# Patient Record
(demographics unavailable — no encounter records)

---

## 2025-03-31 NOTE — ASSESSMENT
[FreeTextEntry1] : # HM f/u AV labs Never had cervical ca screen before, referred to GYN  # Hair thinning, heavy menses f/u iron studies and B12/folate levels  # Moderately severe depression w/ suicidal ideation, moderate anxiety Strongly encouraged patient to cont therapy Patient denies plan or intent to hurt herself or others Discussed starting SSRI - either lexapro or celexa to start, patient might also benefit from wellbutrin eventually being added. She will read about medications and let me know if she wants to start one. Referred to psychiatry f/u CMP and TFTs  # Chronic constipation Pt has been having soft stool lately with no red flag signs/symptoms Will hold off referring to GI for now Also eating habits recently changed due to Ramadan fasting Once patient's diet goes back to normal would continue to monitor  f/u pending lab results   Visit conducted as part of ongoing, longitudinal medical care for patient's medical and other issues.

## 2025-03-31 NOTE — HISTORY OF PRESENT ILLNESS
[FreeTextEntry1] : est care, CPE [de-identified] : Pt comes in to est care and get CPE.  Anxiety/depression: she started going to therapy in last year, feels it is helping. She goes weekly. Denies hx of suicide attempt. She gets panic attacks because she feels the desire to end things. No plan or intent. Has never been on medication for this. Has been this severe on and off since 2018. She has had some good spells.  Has been having stomach issues her whole life. Chronic constipation in childhood, improved in teenage years. Has a lot of bloating. If she doesn't eat for too long she feels a burning in epigastric area, was told it's acid reflux. In last 2 weeks has had softer BMs, not diarrhea. Unusual for her bc she is used to having normal BMs or having constipation. She is just coming out of Ramadan so was eating 1 meal per day. Lost 1-2 lbs unintentionally. Denies blood in stool. Started using pepto in last few days, then noticed darker color stool. Thinks caffeine/milk can cause more bloating/gas for her. Gets some abd pain in center of abd up to 5/10 in severity at its worst. Feels like cramping pain.

## 2025-03-31 NOTE — HEALTH RISK ASSESSMENT
[2] : 2) Feeling down, depressed, or hopeless for more than half of the days (2) [PHQ-2 Positive] : PHQ-2 Positive [Not at All (0)] : 8.) Moving or speaking so slowly that other people could have noticed, or the opposite, moving or speaking faster than usual? Not at all [1/2 of Days or More (2)] : 9.) Thoughts that you would be off dead or of hurting yourself in some way? Half the days or more [Moderately Severe] : Severity of Depression is Moderately Severe [Somewhat Difficult] : How difficult have these problems made it for you to do your work, take care of things at home, or get along with people? Somewhat difficult [No] : In the past 12 months have you used drugs other than those required for medical reasons? No [I have developed a follow-up plan documented below in the note.] : I have developed a follow-up plan documented below in the note. [HIV test declined] : HIV test declined [Hepatitis C test declined] : Hepatitis C test declined [Never] : Never [de-identified] : nothing routine [[id number]] : 4 [de-identified] : eats fruits, cooked vegetables, doesn't eat salads usually. Eats meat, not fish. [[id number]] : 16 [PapSmearComments] : never had before

## 2025-03-31 NOTE — HISTORY OF PRESENT ILLNESS
[FreeTextEntry1] : est care, CPE [de-identified] : Pt comes in to est care and get CPE.  Anxiety/depression: she started going to therapy in last year, feels it is helping. She goes weekly. Denies hx of suicide attempt. She gets panic attacks because she feels the desire to end things. No plan or intent. Has never been on medication for this. Has been this severe on and off since 2018. She has had some good spells.  Has been having stomach issues her whole life. Chronic constipation in childhood, improved in teenage years. Has a lot of bloating. If she doesn't eat for too long she feels a burning in epigastric area, was told it's acid reflux. In last 2 weeks has had softer BMs, not diarrhea. Unusual for her bc she is used to having normal BMs or having constipation. She is just coming out of Ramadan so was eating 1 meal per day. Lost 1-2 lbs unintentionally. Denies blood in stool. Started using pepto in last few days, then noticed darker color stool. Thinks caffeine/milk can cause more bloating/gas for her. Gets some abd pain in center of abd up to 5/10 in severity at its worst. Feels like cramping pain.

## 2025-03-31 NOTE — PHYSICAL EXAM
[No Lymphadenopathy] : no lymphadenopathy [Supple] : supple [Normal] : normal rate, regular rhythm, normal S1 and S2 and no murmur heard [No Edema] : there was no peripheral edema [No Extremity Clubbing/Cyanosis] : no extremity clubbing/cyanosis [Soft] : abdomen soft [Non Tender] : non-tender [Non-distended] : non-distended [No Masses] : no abdominal mass palpated [Normal Supraclavicular Nodes] : no supraclavicular lymphadenopathy [Normal Anterior Cervical Nodes] : no anterior cervical lymphadenopathy [Normal Gait] : normal gait [Speech Grossly Normal] : speech grossly normal [Normal Affect] : the affect was normal [Alert and Oriented x3] : oriented to person, place, and time [Normal Mood] : the mood was normal [Normal Insight/Judgement] : insight and judgment were intact

## 2025-03-31 NOTE — PAST MEDICAL HISTORY
[Menstruating] : menstruating [Excessive Bleeding] : there was excessive bleeding [Regular Cycle Intervals] : have been regular [Dysmenorrhea] : dysmenorrhea

## 2025-03-31 NOTE — HEALTH RISK ASSESSMENT
[2] : 2) Feeling down, depressed, or hopeless for more than half of the days (2) [PHQ-2 Positive] : PHQ-2 Positive [Not at All (0)] : 8.) Moving or speaking so slowly that other people could have noticed, or the opposite, moving or speaking faster than usual? Not at all [1/2 of Days or More (2)] : 9.) Thoughts that you would be off dead or of hurting yourself in some way? Half the days or more [Moderately Severe] : Severity of Depression is Moderately Severe [Somewhat Difficult] : How difficult have these problems made it for you to do your work, take care of things at home, or get along with people? Somewhat difficult [No] : In the past 12 months have you used drugs other than those required for medical reasons? No [I have developed a follow-up plan documented below in the note.] : I have developed a follow-up plan documented below in the note. [HIV test declined] : HIV test declined [Hepatitis C test declined] : Hepatitis C test declined [Never] : Never [de-identified] : nothing routine [de-identified] : eats fruits, cooked vegetables, doesn't eat salads usually. Eats meat, not fish. [[id number]] : 4 [[id number]] : 16 [PapSmearComments] : never had before